# Patient Record
Sex: FEMALE | Race: WHITE
[De-identification: names, ages, dates, MRNs, and addresses within clinical notes are randomized per-mention and may not be internally consistent; named-entity substitution may affect disease eponyms.]

---

## 2016-11-27 VITALS
SYSTOLIC BLOOD PRESSURE: 143 MMHG | DIASTOLIC BLOOD PRESSURE: 67 MMHG | SYSTOLIC BLOOD PRESSURE: 143 MMHG | DIASTOLIC BLOOD PRESSURE: 67 MMHG | DIASTOLIC BLOOD PRESSURE: 67 MMHG | SYSTOLIC BLOOD PRESSURE: 143 MMHG | SYSTOLIC BLOOD PRESSURE: 143 MMHG | DIASTOLIC BLOOD PRESSURE: 67 MMHG

## 2017-07-28 ENCOUNTER — HOSPITAL ENCOUNTER (OUTPATIENT)
Dept: HOSPITAL 63 - MAMMO | Age: 77
Discharge: HOME | End: 2017-07-28
Attending: FAMILY MEDICINE
Payer: MEDICARE

## 2017-07-28 DIAGNOSIS — R92.1: Primary | ICD-10-CM

## 2017-07-28 NOTE — RAD
DATE: 7/28/2017



EXAM: MAMMO BLAIRE DIAG LT



HISTORY: Follow-up asymmetry



COMPARISON: 12/29/2016



This study was interpreted with the benefit of Computerized Aided Detection

(CAD).





The breast parenchyma shows scattered fibroglandular densities. Breast

parenchyma level B.





FINDINGS: 2-D and 3-D tomosynthesis imaging was performed in CC and MLO

projections. The fibroglandular pattern is unchanged. No new or enlarging

breast densities are seen. Scattered benign type calcifications are present.

No suspicious microcalcifications are evident.  





IMPRESSION: Stable left mammograms. Follow-up bilateral mammography in 6

months and then at yearly intervals is suggested to confirm stability.





BI-RADS CATEGORY: 3 PROBABLY BENIGN FINDING(S)-SHORT INTERVAL FOLLOW-UP

SUGGESTED



RECOMMENDED FOLLOW-UP: 6M 6 MONTH FOLLOW-UP



PQRS compliance statement: Patient information was entered into a reminder

system with a target due date     for the next mammogram.



Mammography is a sensitive method for finding small breast cancers, but it

does not detect them all and is not a substitute for careful clinical

examination.  A negative mammogram does not negate a clinically suspicious

finding and should not result in delay in biopsying a clinically suspicious

abnormality.



"Our facility is accredited by the American College of [employer] Mammography

Program."

## 2020-01-29 ENCOUNTER — HOSPITAL ENCOUNTER (OUTPATIENT)
Dept: HOSPITAL 63 - US | Age: 80
Discharge: HOME | End: 2020-01-29
Payer: MEDICARE

## 2020-01-29 DIAGNOSIS — K83.8: Primary | ICD-10-CM

## 2020-01-29 DIAGNOSIS — N28.89: ICD-10-CM

## 2020-01-29 DIAGNOSIS — N28.1: ICD-10-CM

## 2020-01-29 PROCEDURE — 76700 US EXAM ABDOM COMPLETE: CPT

## 2020-01-29 NOTE — RAD
ABDOMEN COMPLETE

 

History: Abdominal pain

 

Comparison: None.

 

Findings:

Multiple sonographic images of the abdomen are submitted. There is no 

abnormality of the visualized pancreas although tail obscured by bowel gas

on this exam. No focal hepatic abnormality is demonstrated. Right kidney 

measured 11.1 x 5 x 5.5 cm, no hydronephrosis. Gallbladder is present, 

distended appearance without demonstrable intraluminal abnormality. 

Gallbladder wall thickness is within normal limits about 0.14 cm. Common 

bile duct is slightly dilated up to 0.9 cm. There is segmental 

visualization of the inferior vena cava. Visualized abdominal aortic 

caliber is within normal limits 1.8 cm although incompletely visualized 

due to bowel gas. Spleen measured up to 10.1 cm, some granulomas present. 

Left kidney measured 10.7 x 5.4 x 4.6 cm, no hydronephrosis. There is a 

nearly anechoic lesion of the superior left kidney up to 3.1 x 3 x 3 cm in

size, likely a cyst. There is also hypoechoic lesion of the inferior left 

kidney up to 1.9 cm, likely a cyst.  There is a 1.9 cm hypoechoic lesion 

of the superior left kidney, likely a cyst.

 

Impression: 

 

1.  There is dilatation of the common bile duct 0.9 cm, also distended 

appearance of the gallbladder without wall thickening or intraluminal 

abnormality.

2. There are left renal cysts.

 

Electronically signed by: Adrian Hyde MD (1/29/2020 9:11 AM) 

[id number]

## 2020-02-21 ENCOUNTER — HOSPITAL ENCOUNTER (OUTPATIENT)
Dept: HOSPITAL 63 - DXRAD | Age: 80
Discharge: HOME | End: 2020-02-21
Payer: MEDICARE

## 2020-02-21 DIAGNOSIS — J84.10: Primary | ICD-10-CM

## 2020-02-21 PROCEDURE — 71046 X-RAY EXAM CHEST 2 VIEWS: CPT

## 2020-02-21 NOTE — RAD
CHEST PA   LATERAL

 

History: Shortness of breath 

 

Comparison: 11/27/2016 2 view chest x-ray exam.

 

Findings: 

Frontal and lateral views of chest were obtained. The cardiomediastinal 

silhouette is normal. Pulmonary vasculature is normal. The lungs are 

clear. Calcified granulomas are present. No pleural effusion or 

pneumothorax is seen. There is no acute bone abnormality. Kyphoplasty is 

noted at 2 consecutive levels of the lower visualized spine.

 

IMPRESSION: 

No acute cardiopulmonary process. 

 

 

Electronically signed by: Patric James MD (2/21/2020 7:21 PM) [id number]

## 2020-08-25 ENCOUNTER — HOSPITAL ENCOUNTER (OUTPATIENT)
Dept: HOSPITAL 63 - MAMMO | Age: 80
Discharge: HOME | End: 2020-08-25
Attending: FAMILY MEDICINE
Payer: MEDICARE

## 2020-08-25 DIAGNOSIS — N95.8: ICD-10-CM

## 2020-08-25 DIAGNOSIS — Z12.31: Primary | ICD-10-CM

## 2020-08-25 DIAGNOSIS — M85.9: ICD-10-CM

## 2020-08-25 PROCEDURE — 77080 DXA BONE DENSITY AXIAL: CPT

## 2020-08-25 PROCEDURE — 77081 DXA BONE DENSITY APPENDICULR: CPT

## 2020-08-25 PROCEDURE — 77067 SCR MAMMO BI INCL CAD: CPT

## 2020-08-25 PROCEDURE — 77063 BREAST TOMOSYNTHESIS BI: CPT

## 2020-08-25 NOTE — RAD
EXAM: DUAL ENERGY X-RAY ABSORPTIOMETRY (DEXA).

 

HISTORY: Postmenopausal screening.

 

FINDINGS: The lowest measured T-score is -1.7 in the left forearm, based 

on a bone mineral density of 0.595 g/cm^2. Refer to the worksheets for 

full detail.

 

There has been a 1.1 percent decrease in density of the left hip compared 

to a study dated 5/15/2013. No prior study of the left forearm is 

available.

 

IMPRESSION: Low bone mass. Bone mineral density yields a T-score between 

-1.0 and -2.5. Fracture risk is increased. 

 

METHODOLOGY: Dual energy x-ray absorptiometry was performed to measure 

bone mineral density. The following analysis is based on the 2019 Official

Positions of the International Society for Clinical Densitometry: 

 

Measurements of the hips and the average of L1-L4 are preferred. When the 

spine and/or hip cannot be feasibly measured or interpreted, or in the 

setting of hyperparathyroidism, distal radial bone mineral density may be 

measured. 

 

The lumbar spine T-score is based on the average bone mineral density of 

L1-L4. In the setting of artifact or anatomic abnormality, some lumbar 

levels may be excluded, and the remaining levels used for calculation. A 

single lumbar level is not used for diagnosis, and if only a single level 

is available for assessment, another anatomic site will be used to assign 

a diagnosis.

 

The hip T-score is based on the bone mineral density measurement of the 

femoral neck or total proximal femur of either side, whichever is lowest. 

Bilateral mean values are not used for diagnosis.

 

The forearm T-score is derived from 33% of the distal radius of the 

nondominant forearm.

 

Electronically signed by: Sara Rock MD (8/25/2020 11:43 AM) [facility]

## 2020-08-25 NOTE — RAD
DATE: 8/25/2020 10:14 AM



EXAM: MAMMO BLAIRE SCREENING BILATERAL



HISTORY:  Screening



COMPARISON: 12/29/2016



Bilateral CC and MLO views of the breasts were performed. Bilateral breast

tomosynthesis was performed in CC and MLO projections.



This study was interpreted with the benefit of Computerized Aided Detection

(CAD).





FINDINGS:



Breast Density: SCATTERED  The breast parenchyma shows scattered

fibroglandular densities. Breast parenchyma level B





No suspicious masses, microcalcifications or architectural distortion is

present to suggest malignancy in either breast.





The visualized axillae are unremarkable. 



IMPRESSION: No mammographic evidence of malignancy. 



BI-RADS CATEGORY: 1 NEGATIVE



RECOMMENDED FOLLOW-UP: 12M 12 MONTH FOLLOW-UP Annual screening mammography is

recommended, unless clinically indicated sooner based on symptoms or change in

physical exam.



PQRS compliance statement: Patient information was entered into a reminder

system with a target due date for the next mammogram.



Mammography is a sensitive method for finding small breast cancers, but it

does not detect them all and is not a substitute for careful clinical

examination.  A negative mammogram does not negate a clinically suspicious

finding and should not result in delay in biopsying a clinically suspicious

abnormality.



"Our facility is accredited by the American College of [employer] Mammography

Program."

## 2020-08-25 NOTE — RAD
EXAM: DUAL ENERGY X-RAY ABSORPTIOMETRY (DEXA).

 

HISTORY: Postmenopausal screening.

 

FINDINGS: The lowest measured T-score is -1.7 in the left forearm, based 

on a bone mineral density of 0.595 g/cm^2. Refer to the worksheets for 

full detail.

 

There has been a 1.1 percent decrease in density of the left hip compared 

to a study dated 5/15/2013. No prior study of the left forearm is 

available.

 

IMPRESSION: Low bone mass. Bone mineral density yields a T-score between 

-1.0 and -2.5. Fracture risk is increased. 

 

METHODOLOGY: Dual energy x-ray absorptiometry was performed to measure 

bone mineral density. The following analysis is based on the 2019 Official

Positions of the International Society for Clinical Densitometry: 

 

Measurements of the hips and the average of L1-L4 are preferred. When the 

spine and/or hip cannot be feasibly measured or interpreted, or in the 

setting of hyperparathyroidism, distal radial bone mineral density may be 

measured. 

 

The lumbar spine T-score is based on the average bone mineral density of 

L1-L4. In the setting of artifact or anatomic abnormality, some lumbar 

levels may be excluded, and the remaining levels used for calculation. A 

single lumbar level is not used for diagnosis, and if only a single level 

is available for assessment, another anatomic site will be used to assign 

a diagnosis.

 

The hip T-score is based on the bone mineral density measurement of the 

femoral neck or total proximal femur of either side, whichever is lowest. 

Bilateral mean values are not used for diagnosis.

 

The forearm T-score is derived from 33% of the distal radius of the 

nondominant forearm.

 

Electronically signed by: Sara Rock MD (8/25/2020 11:43 AM) [facility]

## 2022-03-29 ENCOUNTER — HOSPITAL ENCOUNTER (EMERGENCY)
Dept: HOSPITAL 63 - ER | Age: 82
Discharge: HOME | End: 2022-03-29
Payer: MEDICARE

## 2022-03-29 VITALS — WEIGHT: 187.39 LBS | HEIGHT: 62 IN | BODY MASS INDEX: 34.48 KG/M2

## 2022-03-29 VITALS — DIASTOLIC BLOOD PRESSURE: 80 MMHG | SYSTOLIC BLOOD PRESSURE: 126 MMHG

## 2022-03-29 DIAGNOSIS — J45.909: ICD-10-CM

## 2022-03-29 DIAGNOSIS — S40.011A: ICD-10-CM

## 2022-03-29 DIAGNOSIS — Z88.8: ICD-10-CM
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
## 2022-03-29 DIAGNOSIS — S00.03XA: ICD-10-CM
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
## 2022-03-29 DIAGNOSIS — M25.551: ICD-10-CM
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
## 2022-03-29 DIAGNOSIS — S63.501A: Primary | ICD-10-CM

## 2022-03-29 DIAGNOSIS — S00.83XA: ICD-10-CM

## 2022-03-29 PROCEDURE — 73030 X-RAY EXAM OF SHOULDER: CPT

## 2022-03-29 PROCEDURE — 99284 EMERGENCY DEPT VISIT MOD MDM: CPT

## 2022-03-29 PROCEDURE — 70450 CT HEAD/BRAIN W/O DYE: CPT

## 2022-03-29 PROCEDURE — 72125 CT NECK SPINE W/O DYE: CPT

## 2022-03-29 PROCEDURE — 73502 X-RAY EXAM HIP UNI 2-3 VIEWS: CPT

## 2022-03-29 PROCEDURE — 29125 APPL SHORT ARM SPLINT STATIC: CPT

## 2022-03-29 PROCEDURE — 73110 X-RAY EXAM OF WRIST: CPT

## 2022-03-29 NOTE — PHYS DOC
Past History


Past Medical History:  Anxiety, Asthma, Depression


Past Surgical History:  No Surgical History


Alcohol Use:  None


Drug Use:  None





General Adult


EDM:


Chief Complaint:  MECHANICAL FALL





HPI:


HPI:





Patient is a 81-year-old female who presents for evaluation after injury 

sustained yesterday, around 7 PM.  She reports that she was at home, and she 

tripped and fell onto her right side.  She landed on concrete.  She hit her 

right scalp and face, right shoulder, right hip and then fell on outstretched 

arm on her right upper extremity.  She mostly has pain in her right wrist.  She 

denies loss of consciousness.  She denies premonitory symptoms.  She denies 

dizziness, headache, neck pain, back pain.  She denies chest pain, palpitations,

dyspnea, abdominal pain, nausea or vomiting.  She is not taking anticoagulant 

medications.  She takes oxycodone regularly for chronic pain, she took a dose 

earlier today, she declines pain medication at this time.  No new injury or 

trauma since last night.





Review of Systems:


Review of Systems:


Constitutional:  Denies fever or chills 


Eyes:  Denies change in visual acuity, denies vision loss, denies eye pain


HENT:  Denies nasal congestion or sore throat, denies dental trauma or dental 

pain.  Denies rhinorrhea or or nosebleed


Respiratory:  Denies cough or shortness of breath 


Cardiovascular:  Denies chest pain or edema


GI: Denies abdominal pain, nausea, vomiting or diarrhea.


: Denies urinary symptoms or incontinence


Musculoskeletal:  Denies back pain or neck pain.  Reports mild right hip pain.  

Reports right wrist pain.  


Integument:  Bruising of the right face, scalp, right shoulder.  Denies open 

wounds or rash.


Neurologic:  Denies headache, focal weakness or sensory changes, denies loss of 

consciousness or syncope


Psychiatric:  Denies depression or anxiety





Allergies:


Allergies:





Allergies








Coded Allergies Type Severity Reaction Last Updated Verified


 


  epinephrine Allergy Intermediate  16 Yes


 


  meperidine Allergy Intermediate  16 Yes











Physical Exam:


PE:





Constitutional: Well developed, well nourished, no acute distress, non-toxic 

appearance. []


HENT: Normocephalic, she has a moderate area of soft tissue swelling and 

contusion of her right cheek/zygoma and right frontal and parietal scalp.  No 

step-offs or significant cephalhematoma.  No significant tenderness.  No crepit

us.  TMs are clear bilaterally.  No hemotympanum.  External ears are normal 

bilaterally.  No otorrhea.  Nares are patent clear without rhinorrhea or 

epistaxis.  Oropharynx is patent and clear, no dental trauma.  Mucous membranes 

are moist.


Eyes: PERRL, EOMI, conjunctiva normal, no discharge.  Sclera are clear and 

anicteric.  No nystagmus.  No periorbital edema, erythema, contusion, no 

evidence of ocular trauma, no enophthalmos or exophthalmos.  Globe is grossly 

normal in appearance.  No evidence of entrapment.


Neck: Normal range of motion, no tenderness, supple, no stridor.  Midline 

tenderness or step-offs.


Cardiovascular:Heart rate regular rhythm, +2 radial and +2 posterior tibial 

pulses bilaterally.


Lungs & Thorax:  Bilateral breath sounds clear to auscultation []


Skin: Warm, dry, no erythema, no rash.  No open wounds.  She has a large 

circular contusion on her right shoulder, just distal to the proximal humerus.  

She has contusion of her right scalp and face, as discussed above.  Mild 

contusion of the dorsum of her right wrist.  No open wounds, no lacerations.


Back: No tenderness, no CVA tenderness.  No midline tenderness or step-offs.


Extremities: Mild soft tissue swelling of the right wrist.  Small contusion of 

the dorsal, mid right wrist.  No snuffbox tenderness.  No bony deformity.  Full 

range of motion.  Some mild painful range of motion with right wrist extension. 

 Sensation is grossly intact.  +2 radial pulse.  Pelvis is stable.  Mild 

tenderness to palpation of the right lateral hip.  No visible injury of the 

right hip.  Full painless active and passive range of motion in all directions 

of the right and left hip.  No lower leg tenderness or deformity.  No calf 

tenderness.  Slightly limited range of motion of the right shoulder, especially 

with external rotation and abduction.  No palpable deformity, crepitus or step-

offs are noted.  Right elbow, forearm are nontender.


Neurologic: Alert and oriented X 3, normal motor function, normal sensory 

function, no focal deficits noted.  5/5 motor strength all 4 extremities.  No 

facial asymmetry.  Sensation is grossly intact.  Gait is steady and nonantalgic.


Psychologic: Affect normal, judgement normal, mood normal.  She is pleasant and 

cooperative.





EKG:


EKG:


[]





Radiology/Procedures:


Radiology/Procedures:


                                 IMAGING REPORT





                                     Signed





PATIENT: JOSE LUIS BRADYACCOUNT: [id number]     MRN#: [id number]


: 1940           LOCATION: ER              AGE: 81


SEX: F                    EXAM DT: 22         ACCESSION#: [id number]


STATUS: REG ER            ORD. PHYSICIAN: GEETA KEATING DO


REASON: fall


PROCEDURE: CT HEAD AND CERVICAL SPINE WO





CT HEAD AND C-SPINE WO 





Date: 3/29/2022 12:27 PM 





Clinical Indication: fall, pain





Comparison: None.





Technique:  5 mm axial tomographic images were obtained of the head without 

contrast. These were viewed on brain and bone windows. Noncontrast CT of the 

cervical spine was performed. Sagittal and coronal reformats were performed and 

evaluated. One or more of the following dose reduction techniques were utilized:

 Automated exposure control (AEC), Adjustment of mA and/or kV according to 

patient size, Use of iterative reconstruction technique such as ASiR, CT scan 

done according to ALARA and image gently/image wisely





HEAD FINDINGS:





Mild generalized cerebral and cerebellar volume loss. Mild nonspecific 

periventricular hypoattenuation, most commonly seen with chronic small vessel 

ischemic disease. 





No intra- or extra-axial mass or fluid collection. No acute hemorrhage. The 

ventricles are normal in size, shape, and morphology. The gray-white matter 

junction is normal. The basilar cisterns are patent. 





Opacification of the ethmoid sinuses and partial opacification of the frontal, 

maxillary, and sphenoid sinuses.  The visualized portions of the orbits and 

globes are normal. The mastoid air cells are clear. No aggressive osseous lesion

 or fracture. 





CERVICAL SPINE FINDINGS:


The cervical spine is normally aligned. No acute fracture. No aggressive lytic 

or blastic osseous lesions.





Moderate multilevel degenerative disc space height loss. Multilevel mild spinal 

canal stenosis secondary to disc protrusions and marginal osteophytes. 

Multilevel moderate neuroforaminal narrowing secondary to uncovertebral 

arthrosis. Multilevel moderate facet arthrosis.  





The thyroid gland is normal. No cervical lymphadenopathy. Bilateral carotid 

atherosclerosis. The visualized aerodigestive tract is normal.  





The visualized portions of the lungs are clear. 





IMPRESSION:


1. No acute intracranial process.


2. No acute cervical spine fracture.


3. Extensive paranasal sinus disease.





Electronically signed by: Jaek Dobbins MD (3/29/2022 12:45 PM) [id number]














DICTATED AND SIGNED BY:     JAKE DOBBINS MD


DATE:     22 1243





CC: GEETA KEATING DO; JACOB FLORES MD ~











                                 IMAGING REPORT





                                     Signed





PATIENT: FAREED BRADY: [id number]     MRN#: [id number]


: 1940           LOCATION: ER              AGE: 81


SEX: F                    EXAM DT: 22         ACCESSION#: [id number]


STATUS: REG ER            ORD. PHYSICIAN: GEETA KEATING DO


REASON: FALL, RIGHT HIP PAIN


PROCEDURE: HIP RIGHT 2V WITH PELVIS





EXAM:  XR SHOULDER_RIGHT 2+ VIEWS, XR RIGHT HIP (WITH OR WITHOUT PELVIS) 2 

VIEWS, XR RT WRIST 3VIEWS 3/29/2022 12:27 PM





CLINICAL INDICATION:  Fall, right wrist, right hip, and right shoulder pain.





COMPARISON:  None





TECHNIQUE:  3 views of the right wrist, 3 views of the right hip and pelvis. 3 

views of the right shoulder.





FINDINGS:  


Right wrist: The bones are demineralized. There are old healed distal radius and

 ulnar styloid fractures. No acute fracture. Alignment is normal. There is 

degenerative joint disease at the first CMC and triscaphe joints, fifth MCP 

joint, and throughout the interphalangeal joints. No focal soft tissue 

abnormality.





Right hip with pelvis: The bones are diffusely demineralized. There is a right 

total hip prosthesis. No periprosthetic fracture. Alignment is normal. There is 

moderate degenerative joint disease of the left hip. Mild degenerative changes 

of the pubic symphysis and sacroiliac joints. There are surgical clips in both 

proximal thighs. A short fourth ventricle structure projects over the pelvis.





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 No periprosthetic 

fracture. Alignment is normal. There is a calcified granuloma in the right lung 

base.





IMPRESSION:  


1. No acute osseous abnormality of the right wrist, right hip, or right s

houlder.


2. Osteopenia.





Electronically signed by: Riya Esposito MD (3/29/2022 1:16 PM) [id number]














DICTATED AND SIGNED BY:     RIYA ESPOSITO MD


DATE:     22 1305





CC: GEETA KEATING DO; JACOB FLORES MD ~





Heart Score:


C/O Chest Pain:  No


Risk Factors:


Risk Factors:  DM, Current or recent (<one month) smoker, HTN, HLP, family 

history of CAD, obesity.


Risk Scores:


Score 0 - 3:  2.5% MACE over next 6 weeks - Discharge Home


Score 4 - 6:  20.3% MACE over next 6 weeks - Admit for Clinical Observation


Score 7 - 10:  72.7% MACE over next 6 weeks - Early Invasive Strategies





Course & Med Decision Making:


Course & Med Decision Making


Pertinent Labs and Imaging studies reviewed. (See chart for details)





The patient declined pain medication.  She is given a Velcro wrist plan for her 

right wrist.  I discussed the findings, differential diagnosis and plan of care 

with her.  I discussed home care instructions.  She has plenty of pain 

medications at home.  I told her to follow-up with her PCP.  She feels 

comfortable with the plan for discharge home.  Strict return precautions are 

given.





Dragon Disclaimer:


Dragon Disclaimer:


This electronic medical record was generated, in whole or in part, using a voice

 recognition dictation system.





Departure


Departure:


Impression:  


   Primary Impression:  


   Fall at home


   Qualified Codes:  W19.XXXA - Unspecified fall, initial encounter; Y92.009 - 

   Unspecified place in unspecified non-institutional (private) residence as the

    place of occurrence of the external cause


   Additional Impressions:  


   Facial contusion


   Qualified Codes:  S00.83XA - Contusion of other part of head, initial 

   encounter


   Scalp contusion


   Qualified Codes:  S00.03XA - Contusion of scalp, initial encounter


   Right wrist sprain


   Qualified Codes:  S63.501A - Unspecified sprain of right wrist, initial 

   encounter


   Contusion of right shoulder


   Qualified Codes:  S40.011A - Contusion of right shoulder, initial encounter


   Right hip pain


Disposition:  01 HOME / SELF CARE / HOMELESS


Condition:  STABLE


Referrals:  


JACOB FLORES MD (PCP)


Patient Instructions:  Contusion, Fall Prevention and Home Safety, Wrist Sprain 

with [facility]





Additional Instructions:  


Return to the ER for new injury or trauma, more severe pain, weakness, severe 

headache, neck pain, back pain, numbness or tingling, focal motor weakness, 

chest pain, shortness of breath, nausea vomiting, abdominal pain or other 

concerns.  You may continue taking your prescription pain medication, as 

prescribed by your primary care doctor.  Follow-up with your PCP for further 

evaluation, routine care.











GEETA KEATING DO             Mar 29, 2022 12:04

## 2022-03-29 NOTE — RAD
EXAM:  XR SHOULDER_RIGHT 2+ VIEWS, XR RIGHT HIP (WITH OR WITHOUT PELVIS) 2 VIEWS, XR RT WRIST 3VIEWS 
3/29/2022 12:27 PM



CLINICAL INDICATION:  Fall, right wrist, right hip, and right shoulder pain.



COMPARISON:  None



TECHNIQUE:  3 views of the right wrist, 3 views of the right hip and pelvis. 3 views of the right ramo
ulder.



FINDINGS:  

Right wrist: The bones are demineralized. There are old healed distal radius and ulnar styloid fractu
res. No acute fracture. Alignment is normal. 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
 triscaphe joints, fifth MCP joint, and throughout the interphalangeal joints. No focal soft tissue a
bnormality.



Right hip with pelvis: The bones are diffusely demineralized. 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 
No periprosthetic fracture. Alignment is normal. There is moderate degenerative joint disease of the 
left hip. Mild degenerative changes of the pubic symphysis and sacroiliac joints. There are surgical 
clips in both proximal thighs. A short fourth ventricle structure projects over the pelvis.



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 No periprosthetic fracture. Alignment is 
normal. There is a calcified granuloma in the right lung base.



IMPRESSION:  

1. No acute osseous abnormality of the right wrist, right hip, or right shoulder.

2. Osteopenia.



Electronically signed by: Riya Esposito MD (3/29/2022 1:16 PM) [id number]

## 2022-03-29 NOTE — RAD
CT HEAD AND C-SPINE WO 



Date: 3/29/2022 12:27 PM 



Clinical Indication: fall, pain



Comparison: None.



Technique:  5 mm axial tomographic images were obtained of the head without contrast. These were view
ed on brain and bone windows. Noncontrast CT of the cervical spine was performed. Sagittal and corona
l reformats were performed and evaluated. One or more of the following dose reduction techniques were
 utilized: Automated exposure control (AEC), Adjustment of mA and/or kV according to patient size, Us
e of iterative reconstruction technique such as ASiR, CT scan done according to ALARA and image gentl
y/image wisely



HEAD FINDINGS:



Mild generalized cerebral and cerebellar volume loss. Mild nonspecific periventricular hypoattenuatio
n, most commonly seen with chronic small vessel ischemic disease. 



No intra- or extra-axial mass or fluid collection. No acute hemorrhage. The ventricles are normal in 
size, shape, and morphology. The gray-white matter junction is normal. The basilar cisterns are paten
t. 



Opacification of the ethmoid sinuses and partial opacification of the frontal, maxillary, and sphenoi
d sinuses.  The visualized portions of the orbits and globes are normal. The mastoid air cells are cl
ear. No aggressive osseous lesion or fracture. 



CERVICAL SPINE FINDINGS:

The cervical spine is normally aligned. No acute fracture. No aggressive lytic or blastic osseous les
ions.



Moderate multilevel degenerative disc space height loss. Multilevel mild spinal canal stenosis second
cherry to disc protrusions and marginal osteophytes. Multilevel moderate neuroforaminal narrowing second
cherry to uncovertebral arthrosis. Multilevel moderate facet arthrosis.  



The thyroid gland is normal. No cervical lymphadenopathy. Bilateral carotid atherosclerosis. The visu
alized aerodigestive tract is normal.  



The visualized portions of the lungs are clear. 



IMPRESSION:

1. No acute intracranial process.

2. No acute cervical spine fracture.

3. Extensive paranasal sinus disease.



Electronically signed by: Adrian Dobbins MD (3/29/2022 12:45 PM) [id number]

## 2022-03-29 NOTE — RAD
EXAM:  XR SHOULDER_RIGHT 2+ VIEWS, XR RIGHT HIP (WITH OR WITHOUT PELVIS) 2 VIEWS, XR RT WRIST 3VIEWS 
3/29/2022 12:27 PM



CLINICAL INDICATION:  Fall, right wrist, right hip, and right shoulder pain.



COMPARISON:  None



TECHNIQUE:  3 views of the right wrist, 3 views of the right hip and pelvis. 3 views of the right ramo
ulder.



FINDINGS:  

Right wrist: The bones are demineralized. There are old healed distal radius and ulnar styloid fractu
res. No acute fracture. Alignment is normal. 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
 triscaphe joints, fifth MCP joint, and throughout the interphalangeal joints. No focal soft tissue a
bnormality.



Right hip with pelvis: The bones are diffusely demineralized. 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 
No periprosthetic fracture. Alignment is normal. There is moderate degenerative joint disease of the 
left hip. Mild degenerative changes of the pubic symphysis and sacroiliac joints. There are surgical 
clips in both proximal thighs. A short fourth ventricle structure projects over the pelvis.



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 No periprosthetic fracture. Alignment is 
normal. There is a calcified granuloma in the right lung base.



IMPRESSION:  

1. No acute osseous abnormality of the right wrist, right hip, or right shoulder.

2. Osteopenia.



Electronically signed by: Riya Esposito MD (3/29/2022 1:16 PM) [id number]

## 2022-03-29 NOTE — RAD
EXAM:  XR SHOULDER_RIGHT 2+ VIEWS, XR RIGHT HIP (WITH OR WITHOUT PELVIS) 2 VIEWS, XR RT WRIST 3VIEWS 
3/29/2022 12:27 PM



CLINICAL INDICATION:  Fall, right wrist, right hip, and right shoulder pain.



COMPARISON:  None



TECHNIQUE:  3 views of the right wrist, 3 views of the right hip and pelvis. 3 views of the right ramo
ulder.



FINDINGS:  

Right wrist: The bones are demineralized. There are old healed distal radius and ulnar styloid fractu
res. No acute fracture. Alignment is normal. 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
 triscaphe joints, fifth MCP joint, and throughout the interphalangeal joints. No focal soft tissue a
bnormality.



Right hip with pelvis: The bones are diffusely demineralized. 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 
No periprosthetic fracture. Alignment is normal. There is moderate degenerative joint disease of the 
left hip. Mild degenerative changes of the pubic symphysis and sacroiliac joints. There are surgical 
clips in both proximal thighs. A short fourth ventricle structure projects over the pelvis.



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 No periprosthetic fracture. Alignment is 
normal. There is a calcified granuloma in the right lung base.



IMPRESSION:  

1. No acute osseous abnormality of the right wrist, right hip, or right shoulder.

2. Osteopenia.



Electronically signed by: Riya Esposito MD (3/29/2022 1:16 PM) [id number]